# Patient Record
Sex: MALE | Race: WHITE | URBAN - NONMETROPOLITAN AREA
[De-identification: names, ages, dates, MRNs, and addresses within clinical notes are randomized per-mention and may not be internally consistent; named-entity substitution may affect disease eponyms.]

---

## 2021-10-20 ENCOUNTER — WEB ENCOUNTER (OUTPATIENT)
Dept: URBAN - NONMETROPOLITAN AREA CLINIC 13 | Facility: CLINIC | Age: 21
End: 2021-10-20

## 2021-10-20 ENCOUNTER — OFFICE VISIT (OUTPATIENT)
Dept: URBAN - NONMETROPOLITAN AREA CLINIC 13 | Facility: CLINIC | Age: 21
End: 2021-10-20
Payer: COMMERCIAL

## 2021-10-20 ENCOUNTER — LAB OUTSIDE AN ENCOUNTER (OUTPATIENT)
Dept: URBAN - NONMETROPOLITAN AREA CLINIC 13 | Facility: CLINIC | Age: 21
End: 2021-10-20

## 2021-10-20 DIAGNOSIS — K62.5 RECTAL BLEEDING: ICD-10-CM

## 2021-10-20 DIAGNOSIS — R19.4 CHANGE IN BOWEL HABIT: ICD-10-CM

## 2021-10-20 DIAGNOSIS — K12.1 ORAL ULCER: ICD-10-CM

## 2021-10-20 PROCEDURE — 99204 OFFICE O/P NEW MOD 45 MIN: CPT | Performed by: NURSE PRACTITIONER

## 2021-10-20 RX ORDER — LISDEXAMFETAMINE DIMESYLATE 50 MG
1 CAPSULE IN THE MORNING CAPSULE ORAL ONCE A DAY
Status: ACTIVE | COMMUNITY

## 2021-10-20 NOTE — HPI-TODAY'S VISIT:
10/20/21 Homer Danielle is a 22 YO M who presents for first visit with complaint rectal bleeding that started about 2 weeks ago. He had urgency for bowel movement and passed bright red blood with no stool. Denies mucous. He had 3-4 bloody stools that day, all with urgency. No incontinence. This has continued and seems to be more frequent when he drinks his protein shakes. He does have normal appearing stools that are formed and brown at times. He notes some blood on the toilet tissue at these times. He has occasional loose stools. Denies abdominal pain. no rectal pain. He does not think he has hemorrhoids. No nausea/vomiting. He reports poor appetite most of his life. He relates this to "being wired" and reports he typically can eat more during the night. His weight is stable. Denies heartburn.  He has history of esophageal ulcers after taking clindamycin about 5-6 years ago. He did not have endoscopy. Symptoms resolved with medication. He takes acyclovir for oral ulcers that occur frequently. Recently, he's noticed some cracking in his lips. No rashes. Denies joint pain. No changes in vision.  His maternal grandfather had colon cancer. No f/h of IBD. He has not had any recent blood work.  Otherwise, he is a healthy and is a senior at [employer], majoring in biology, and currently applying to dental school. DREW

## 2021-10-22 ENCOUNTER — OFFICE VISIT (OUTPATIENT)
Dept: URBAN - NONMETROPOLITAN AREA CLINIC 13 | Facility: CLINIC | Age: 21
End: 2021-10-22

## 2021-11-18 ENCOUNTER — OFFICE VISIT (OUTPATIENT)
Dept: URBAN - NONMETROPOLITAN AREA SURGERY CENTER 1 | Facility: SURGERY CENTER | Age: 21
End: 2021-11-18

## 2021-12-08 ENCOUNTER — OFFICE VISIT (OUTPATIENT)
Dept: URBAN - NONMETROPOLITAN AREA CLINIC 13 | Facility: CLINIC | Age: 21
End: 2021-12-08

## 2022-10-26 ENCOUNTER — LAB OUTSIDE AN ENCOUNTER (OUTPATIENT)
Dept: URBAN - NONMETROPOLITAN AREA CLINIC 2 | Facility: CLINIC | Age: 22
End: 2022-10-26

## 2022-10-26 ENCOUNTER — OFFICE VISIT (OUTPATIENT)
Dept: URBAN - NONMETROPOLITAN AREA CLINIC 2 | Facility: CLINIC | Age: 22
End: 2022-10-26
Payer: COMMERCIAL

## 2022-10-26 ENCOUNTER — DASHBOARD ENCOUNTERS (OUTPATIENT)
Age: 22
End: 2022-10-26

## 2022-10-26 ENCOUNTER — WEB ENCOUNTER (OUTPATIENT)
Dept: URBAN - NONMETROPOLITAN AREA CLINIC 2 | Facility: CLINIC | Age: 22
End: 2022-10-26

## 2022-10-26 ENCOUNTER — ERX REFILL RESPONSE (OUTPATIENT)
Dept: URBAN - NONMETROPOLITAN AREA CLINIC 2 | Facility: CLINIC | Age: 22
End: 2022-10-26

## 2022-10-26 VITALS
HEART RATE: 104 BPM | SYSTOLIC BLOOD PRESSURE: 129 MMHG | BODY MASS INDEX: 21.43 KG/M2 | TEMPERATURE: 97 F | WEIGHT: 161.7 LBS | DIASTOLIC BLOOD PRESSURE: 87 MMHG | HEIGHT: 73 IN

## 2022-10-26 DIAGNOSIS — R10.13 EPIGASTRIC PAIN: ICD-10-CM

## 2022-10-26 DIAGNOSIS — K92.0 HEMATEMESIS WITH NAUSEA: ICD-10-CM

## 2022-10-26 DIAGNOSIS — K62.5 RECTAL BLEEDING: ICD-10-CM

## 2022-10-26 PROCEDURE — 99214 OFFICE O/P EST MOD 30 MIN: CPT | Performed by: NURSE PRACTITIONER

## 2022-10-26 RX ORDER — SUCRALFATE 1 G
1 TABLET DISSOLVED IN A TABLESPOON OF WATER ON AN EMPTY STOMACH TABLET ORAL QID
Qty: 120 TABLET | Refills: 6 | OUTPATIENT
Start: 2022-10-26 | End: 2023-05-24

## 2022-10-26 RX ORDER — OMEPRAZOLE 40 MG/1
1 CAPSULE 30 MINUTES BEFORE MEALS CAPSULE, DELAYED RELEASE ORAL BID
Qty: 180 | Refills: 11 | OUTPATIENT

## 2022-10-26 RX ORDER — OMEPRAZOLE 40 MG/1
1 CAPSULE 30 MINUTES BEFORE MEALS CAPSULE, DELAYED RELEASE ORAL BID
Qty: 180 | Refills: 11 | OUTPATIENT
Start: 2022-10-26

## 2022-10-26 RX ORDER — OMEPRAZOLE 40 MG/1
1 CAPSULE 30 MINUTES BEFORE MORNING MEAL CAPSULE, DELAYED RELEASE ORAL ONCE A DAY
Qty: 90 | Refills: 3 | OUTPATIENT

## 2022-10-26 RX ORDER — LISDEXAMFETAMINE DIMESYLATE 50 MG
1 CAPSULE IN THE MORNING CAPSULE ORAL ONCE A DAY
Status: ACTIVE | COMMUNITY

## 2022-10-26 NOTE — HPI-OTHER HISTORIES
10/20/21 Homer Danielle is a 22 YO M who presents for first visit with complaint rectal bleeding that started about 2 weeks ago. He had urgency for bowel movement and passed bright red blood with no stool. Denies mucous. He had 3-4 bloody stools that day, all with urgency. No incontinence. This has continued and seems to be more frequent when he drinks his protein shakes. He does have normal appearing stools that are formed and brown at times. He notes some blood on the toilet tissue at these times. He has occasional loose stools. Denies abdominal pain. no rectal pain. He does not think he has hemorrhoids. No nausea/vomiting. He reports poor appetite most of his life. He relates this to "being wired" and reports he typically can eat more during the night. His weight is stable. Denies heartburn.  He has history of esophageal ulcers after taking clindamycin about 5-6 years ago. He did not have endoscopy. Symptoms resolved with medication. He takes acyclovir for oral ulcers that occur frequently. Recently, he's noticed some cracking in his lips. No rashes. Denies joint pain. No changes in vision.  His maternal grandfather had colon cancer. No f/h of IBD. He has not had any recent blood work.  Otherwise, he is a healthy and is a senior at [employer], majoring in biology, and currently applying to dental school. DREW

## 2022-10-26 NOTE — HPI-TODAY'S VISIT:
10/26/2022 Mr. Homer Danielle is a 22 year old male here for vomiting blood. He was last seen a year ago for rectal bleeding by Maryuri Sanchez NP. he had normal labs and suggested to have a colonoscopy. He cancelled this. He denies any further rectal bleeding. He thinks this was related to using protein supplement. he is now complaining of vomiting blood. He started vomiting on Monday. This was dark in color per his report. On Tuesday, he vomited about this was dark brown and then he vomited bright red. He vomited a total of 4 times. He has been eating normally between episodes. He is having some epigastric pain and burning at the top of his stomach. He has been nauseated. He called his PCP and given zofran. He has been having some chest pain and reflux recently. He drinks 2-5 alcohol drinks a week. He taking Goody's twice a month. He drinks caffeine rarely. He did have some darker stools. CS

## 2022-11-02 ENCOUNTER — LAB OUTSIDE AN ENCOUNTER (OUTPATIENT)
Dept: URBAN - NONMETROPOLITAN AREA CLINIC 2 | Facility: CLINIC | Age: 22
End: 2022-11-02

## 2022-11-02 LAB
HEMATOCRIT: 38.9
HEMOGLOBIN: 13.5
IRON SATURATION: 16
IRON: 51
MEAN CORPUSCULAR HEMOGLOBIN CONC: 34.7
MEAN CORPUSCULAR HEMOGLOBIN: 31.6
MEAN CORPUSCULAR VOLUME: 91.1
MEAN PLATELET VOLUME: 8.3
PLATELET COUNT: 181
RED BLOOD CELL COUNT: 4.27
RED CELL DISTRIBUTION WIDTH: 12.1
TOTAL IRON BINDING CAPACITY: 325
UNSATURATED IRON BINDING CAPACITY: 274
WHITE BLOOD CELL COUNT: 6.2

## 2022-11-28 ENCOUNTER — TELEPHONE ENCOUNTER (OUTPATIENT)
Dept: URBAN - NONMETROPOLITAN AREA CLINIC 2 | Facility: CLINIC | Age: 22
End: 2022-11-28

## 2022-12-05 ENCOUNTER — OFFICE VISIT (OUTPATIENT)
Dept: URBAN - NONMETROPOLITAN AREA SURGERY CENTER 1 | Facility: SURGERY CENTER | Age: 22
End: 2022-12-05

## 2023-04-11 ENCOUNTER — OFFICE VISIT (OUTPATIENT)
Dept: URBAN - NONMETROPOLITAN AREA CLINIC 13 | Facility: CLINIC | Age: 23
End: 2023-04-11